# Patient Record
Sex: FEMALE | Race: WHITE | Employment: UNEMPLOYED
[De-identification: names, ages, dates, MRNs, and addresses within clinical notes are randomized per-mention and may not be internally consistent; named-entity substitution may affect disease eponyms.]

---

## 2017-01-26 ENCOUNTER — CARE COORDINATION (OUTPATIENT)
Dept: CARE COORDINATION | Facility: CLINIC | Age: 82
End: 2017-01-26

## 2017-02-21 ENCOUNTER — TELEPHONE (OUTPATIENT)
Dept: INTERNAL MEDICINE | Facility: CLINIC | Age: 82
End: 2017-02-21

## 2017-02-21 DIAGNOSIS — R53.1 WEAKNESS: Primary | ICD-10-CM

## 2017-02-21 DIAGNOSIS — R26.81 GAIT INSTABILITY: ICD-10-CM

## 2017-02-21 DIAGNOSIS — M19.91 PRIMARY OSTEOARTHRITIS, UNSPECIFIED SITE: ICD-10-CM

## 2017-02-21 DIAGNOSIS — G20 PARKINSON'S DISEASE (HCC): ICD-10-CM

## 2017-02-24 RX ORDER — LORAZEPAM 0.5 MG/1
TABLET ORAL
Qty: 90 TABLET | Refills: 0 | Status: SHIPPED | OUTPATIENT
Start: 2017-02-24 | End: 2017-03-29 | Stop reason: SDUPTHER

## 2017-03-16 ENCOUNTER — TELEPHONE (OUTPATIENT)
Dept: PRIMARY CARE CLINIC | Age: 82
End: 2017-03-16

## 2017-03-22 ENCOUNTER — TELEPHONE (OUTPATIENT)
Dept: PRIMARY CARE CLINIC | Age: 82
End: 2017-03-22

## 2017-03-26 DIAGNOSIS — B37.2 YEAST DERMATITIS: ICD-10-CM

## 2017-03-28 DIAGNOSIS — L30.9 DERMATITIS: Primary | ICD-10-CM

## 2017-03-28 RX ORDER — NYSTATIN 100000 U/G
CREAM TOPICAL
Qty: 30 G | Refills: 2 | Status: SHIPPED | OUTPATIENT
Start: 2017-03-28 | End: 2017-04-03 | Stop reason: SDUPTHER

## 2017-03-28 RX ORDER — TRIAMCINOLONE ACETONIDE 0.25 MG/G
CREAM TOPICAL
Qty: 30 G | Refills: 2 | Status: SHIPPED | OUTPATIENT
Start: 2017-03-28 | End: 2017-03-29 | Stop reason: ALTCHOICE

## 2017-03-29 ENCOUNTER — TELEPHONE (OUTPATIENT)
Dept: PRIMARY CARE CLINIC | Age: 82
End: 2017-03-29

## 2017-03-29 ENCOUNTER — OFFICE VISIT (OUTPATIENT)
Dept: PRIMARY CARE CLINIC | Age: 82
End: 2017-03-29
Payer: MEDICARE

## 2017-03-29 VITALS
DIASTOLIC BLOOD PRESSURE: 64 MMHG | SYSTOLIC BLOOD PRESSURE: 122 MMHG | BODY MASS INDEX: 37.17 KG/M2 | HEART RATE: 68 BPM | RESPIRATION RATE: 18 BRPM | HEIGHT: 62 IN | WEIGHT: 202 LBS

## 2017-03-29 DIAGNOSIS — E11.9 TYPE 2 DIABETES MELLITUS WITHOUT COMPLICATION, WITHOUT LONG-TERM CURRENT USE OF INSULIN (HCC): Primary | ICD-10-CM

## 2017-03-29 DIAGNOSIS — G20 PARKINSON'S DISEASE (HCC): ICD-10-CM

## 2017-03-29 DIAGNOSIS — R26.81 GAIT INSTABILITY: ICD-10-CM

## 2017-03-29 DIAGNOSIS — M15.9 OSTEOARTHRITIS OF MULTIPLE JOINTS, UNSPECIFIED OSTEOARTHRITIS TYPE: Chronic | ICD-10-CM

## 2017-03-29 DIAGNOSIS — B37.2 YEAST DERMATITIS: ICD-10-CM

## 2017-03-29 LAB — HBA1C MFR BLD: 7 %

## 2017-03-29 PROCEDURE — G8400 PT W/DXA NO RESULTS DOC: HCPCS | Performed by: NURSE PRACTITIONER

## 2017-03-29 PROCEDURE — G8598 ASA/ANTIPLAT THER USED: HCPCS | Performed by: NURSE PRACTITIONER

## 2017-03-29 PROCEDURE — 83036 HEMOGLOBIN GLYCOSYLATED A1C: CPT | Performed by: NURSE PRACTITIONER

## 2017-03-29 PROCEDURE — 1123F ACP DISCUSS/DSCN MKR DOCD: CPT | Performed by: NURSE PRACTITIONER

## 2017-03-29 PROCEDURE — G8484 FLU IMMUNIZE NO ADMIN: HCPCS | Performed by: NURSE PRACTITIONER

## 2017-03-29 PROCEDURE — G8427 DOCREV CUR MEDS BY ELIG CLIN: HCPCS | Performed by: NURSE PRACTITIONER

## 2017-03-29 PROCEDURE — 99214 OFFICE O/P EST MOD 30 MIN: CPT | Performed by: NURSE PRACTITIONER

## 2017-03-29 PROCEDURE — G8417 CALC BMI ABV UP PARAM F/U: HCPCS | Performed by: NURSE PRACTITIONER

## 2017-03-29 PROCEDURE — 4040F PNEUMOC VAC/ADMIN/RCVD: CPT | Performed by: NURSE PRACTITIONER

## 2017-03-29 PROCEDURE — 1090F PRES/ABSN URINE INCON ASSESS: CPT | Performed by: NURSE PRACTITIONER

## 2017-03-29 PROCEDURE — 1036F TOBACCO NON-USER: CPT | Performed by: NURSE PRACTITIONER

## 2017-03-29 RX ORDER — LORAZEPAM 0.5 MG/1
TABLET ORAL
Qty: 30 TABLET | Refills: 0 | Status: SHIPPED | OUTPATIENT
Start: 2017-03-29 | End: 2017-05-22 | Stop reason: SDUPTHER

## 2017-03-29 RX ORDER — TRAMADOL HYDROCHLORIDE 50 MG/1
TABLET ORAL
Qty: 120 TABLET | Refills: 0 | Status: SHIPPED | OUTPATIENT
Start: 2017-03-29 | End: 2017-12-01 | Stop reason: SDUPTHER

## 2017-03-29 RX ORDER — HYDROCODONE BITARTRATE AND ACETAMINOPHEN 5; 325 MG/1; MG/1
1 TABLET ORAL EVERY 8 HOURS PRN
Qty: 90 TABLET | Refills: 0 | Status: SHIPPED | OUTPATIENT
Start: 2017-03-29 | End: 2017-12-01 | Stop reason: SDUPTHER

## 2017-03-29 ASSESSMENT — ENCOUNTER SYMPTOMS
PHOTOPHOBIA: 0
RHINORRHEA: 0
SORE THROAT: 0
COUGH: 0
BACK PAIN: 1
SHORTNESS OF BREATH: 0
CONSTIPATION: 0
DIARRHEA: 0

## 2017-04-03 ENCOUNTER — TELEPHONE (OUTPATIENT)
Dept: PRIMARY CARE CLINIC | Age: 82
End: 2017-04-03

## 2017-04-03 RX ORDER — NYSTATIN 100000 U/G
CREAM TOPICAL
Qty: 30 G | Refills: 1 | Status: SHIPPED | OUTPATIENT
Start: 2017-04-03

## 2017-04-19 ENCOUNTER — TELEPHONE (OUTPATIENT)
Dept: PRIMARY CARE CLINIC | Age: 82
End: 2017-04-19

## 2017-05-22 RX ORDER — LORAZEPAM 0.5 MG/1
TABLET ORAL
Qty: 30 TABLET | Refills: 1 | Status: SHIPPED | OUTPATIENT
Start: 2017-05-22 | End: 2017-12-01 | Stop reason: ALTCHOICE

## 2017-06-16 ENCOUNTER — TELEPHONE (OUTPATIENT)
Dept: PRIMARY CARE CLINIC | Age: 82
End: 2017-06-16

## 2017-06-16 RX ORDER — SENNA PLUS 8.6 MG/1
TABLET ORAL
Qty: 60 TABLET | Refills: 11 | Status: SHIPPED | OUTPATIENT
Start: 2017-06-16 | End: 2017-12-01 | Stop reason: ALTCHOICE

## 2017-06-24 ENCOUNTER — APPOINTMENT (OUTPATIENT)
Dept: CT IMAGING | Age: 82
End: 2017-06-24
Payer: MEDICARE

## 2017-06-24 ENCOUNTER — HOSPITAL ENCOUNTER (EMERGENCY)
Age: 82
Discharge: HOME | End: 2017-06-24
Attending: EMERGENCY MEDICINE
Payer: MEDICARE

## 2017-06-24 VITALS
TEMPERATURE: 97.9 F | HEART RATE: 66 BPM | RESPIRATION RATE: 18 BRPM | BODY MASS INDEX: 36.62 KG/M2 | OXYGEN SATURATION: 96 % | SYSTOLIC BLOOD PRESSURE: 152 MMHG | HEIGHT: 62 IN | WEIGHT: 199 LBS | DIASTOLIC BLOOD PRESSURE: 61 MMHG

## 2017-06-24 DIAGNOSIS — K59.00 CONSTIPATION, UNSPECIFIED CONSTIPATION TYPE: Primary | ICD-10-CM

## 2017-06-24 LAB
ABSOLUTE EOS #: 0.1 K/UL (ref 0–0.4)
ABSOLUTE LYMPH #: 0.5 K/UL (ref 1–4.8)
ABSOLUTE MONO #: 0.5 K/UL (ref 0.1–1.2)
AMYLASE: 32 U/L (ref 28–100)
ANION GAP SERPL CALCULATED.3IONS-SCNC: 15 MMOL/L (ref 9–17)
BASOPHILS # BLD: 1 %
BASOPHILS ABSOLUTE: 0 K/UL (ref 0–0.2)
BILIRUBIN URINE: NEGATIVE
BUN BLDV-MCNC: 15 MG/DL (ref 8–23)
BUN/CREAT BLD: ABNORMAL (ref 9–20)
CALCIUM SERPL-MCNC: 9.6 MG/DL (ref 8.6–10.4)
CHLORIDE BLD-SCNC: 101 MMOL/L (ref 98–107)
CO2: 23 MMOL/L (ref 20–31)
COLOR: YELLOW
COMMENT UA: NORMAL
CREAT SERPL-MCNC: 0.77 MG/DL (ref 0.5–0.9)
DIFFERENTIAL TYPE: ABNORMAL
EOSINOPHILS RELATIVE PERCENT: 2 %
GFR AFRICAN AMERICAN: >60 ML/MIN
GFR NON-AFRICAN AMERICAN: >60 ML/MIN
GFR SERPL CREATININE-BSD FRML MDRD: ABNORMAL ML/MIN/{1.73_M2}
GFR SERPL CREATININE-BSD FRML MDRD: ABNORMAL ML/MIN/{1.73_M2}
GLUCOSE BLD-MCNC: 161 MG/DL (ref 70–99)
GLUCOSE URINE: NEGATIVE
HCT VFR BLD CALC: 36.6 % (ref 36–46)
HEMOGLOBIN: 11.9 G/DL (ref 12–16)
KETONES, URINE: NEGATIVE
LEUKOCYTE ESTERASE, URINE: NEGATIVE
LIPASE: 21 U/L (ref 13–60)
LYMPHOCYTES # BLD: 9 %
MCH RBC QN AUTO: 28.2 PG (ref 26–34)
MCHC RBC AUTO-ENTMCNC: 32.4 G/DL (ref 31–37)
MCV RBC AUTO: 86.8 FL (ref 80–100)
MONOCYTES # BLD: 9 %
NITRITE, URINE: NEGATIVE
PDW BLD-RTO: 14.3 % (ref 12.5–15.4)
PH UA: 7 (ref 5–8)
PLATELET # BLD: 261 K/UL (ref 140–450)
PLATELET ESTIMATE: ABNORMAL
PMV BLD AUTO: 7.7 FL (ref 6–12)
POTASSIUM SERPL-SCNC: 4.6 MMOL/L (ref 3.7–5.3)
PROTEIN UA: NEGATIVE
RBC # BLD: 4.21 M/UL (ref 4–5.2)
RBC # BLD: ABNORMAL 10*6/UL
SEG NEUTROPHILS: 79 %
SEGMENTED NEUTROPHILS ABSOLUTE COUNT: 4.4 K/UL (ref 1.8–7.7)
SODIUM BLD-SCNC: 139 MMOL/L (ref 135–144)
SPECIFIC GRAVITY UA: 1.01 (ref 1–1.03)
TURBIDITY: CLEAR
URINE HGB: NEGATIVE
UROBILINOGEN, URINE: NORMAL
WBC # BLD: 5.5 K/UL (ref 3.5–11)
WBC # BLD: ABNORMAL 10*3/UL

## 2017-06-24 PROCEDURE — 83690 ASSAY OF LIPASE: CPT

## 2017-06-24 PROCEDURE — 74177 CT ABD & PELVIS W/CONTRAST: CPT

## 2017-06-24 PROCEDURE — 2580000003 HC RX 258: Performed by: EMERGENCY MEDICINE

## 2017-06-24 PROCEDURE — 80048 BASIC METABOLIC PNL TOTAL CA: CPT

## 2017-06-24 PROCEDURE — 36415 COLL VENOUS BLD VENIPUNCTURE: CPT

## 2017-06-24 PROCEDURE — 85025 COMPLETE CBC W/AUTO DIFF WBC: CPT

## 2017-06-24 PROCEDURE — 82150 ASSAY OF AMYLASE: CPT

## 2017-06-24 PROCEDURE — 6360000004 HC RX CONTRAST MEDICATION: Performed by: EMERGENCY MEDICINE

## 2017-06-24 PROCEDURE — 99284 EMERGENCY DEPT VISIT MOD MDM: CPT

## 2017-06-24 RX ORDER — 0.9 % SODIUM CHLORIDE 0.9 %
1000 INTRAVENOUS SOLUTION INTRAVENOUS ONCE
Status: COMPLETED | OUTPATIENT
Start: 2017-06-24 | End: 2017-06-24

## 2017-06-24 RX ORDER — LACTULOSE 10 G/10G
10 SOLUTION ORAL 3 TIMES DAILY
Qty: 10 PACKET | Refills: 0 | Status: SHIPPED | OUTPATIENT
Start: 2017-06-24 | End: 2017-12-01 | Stop reason: ALTCHOICE

## 2017-06-24 RX ORDER — SODIUM CHLORIDE 0.9 % (FLUSH) 0.9 %
10 SYRINGE (ML) INJECTION PRN
Status: DISCONTINUED | OUTPATIENT
Start: 2017-06-24 | End: 2017-06-24 | Stop reason: HOSPADM

## 2017-06-24 RX ORDER — 0.9 % SODIUM CHLORIDE 0.9 %
100 INTRAVENOUS SOLUTION INTRAVENOUS ONCE
Status: COMPLETED | OUTPATIENT
Start: 2017-06-24 | End: 2017-06-24

## 2017-06-24 RX ADMIN — SODIUM CHLORIDE 100 ML: 9 INJECTION, SOLUTION INTRAVENOUS at 10:39

## 2017-06-24 RX ADMIN — IOHEXOL 50 ML: 240 INJECTION, SOLUTION INTRATHECAL; INTRAVASCULAR; INTRAVENOUS; ORAL at 10:39

## 2017-06-24 RX ADMIN — SODIUM CHLORIDE 1000 ML: 9 INJECTION, SOLUTION INTRAVENOUS at 09:57

## 2017-06-24 RX ADMIN — Medication 10 ML: at 10:40

## 2017-06-24 RX ADMIN — IOVERSOL 130 ML: 741 INJECTION INTRA-ARTERIAL; INTRAVENOUS at 10:39

## 2017-07-07 DIAGNOSIS — I50.32 CHRONIC DIASTOLIC HEART FAILURE (HCC): ICD-10-CM

## 2017-07-07 DIAGNOSIS — Z13.6 SCREENING FOR CARDIOVASCULAR CONDITION: ICD-10-CM

## 2017-07-07 DIAGNOSIS — E11.9 CONTROLLED TYPE 2 DIABETES MELLITUS WITHOUT COMPLICATION, WITHOUT LONG-TERM CURRENT USE OF INSULIN (HCC): ICD-10-CM

## 2017-07-07 LAB
CHOLESTEROL, TOTAL: 145 MG/DL
CHOLESTEROL/HDL RATIO: 3.2
CREATININE URINE: 125 MG/DL
HDLC SERPL-MCNC: 45 MG/DL (ref 35–70)
LDL CHOLESTEROL CALCULATED: 72 MG/DL (ref 0–160)
MICROALBUMIN/CREAT 24H UR: 16.9 MG/G{CREAT}
TRIGL SERPL-MCNC: 139 MG/DL
VLDLC SERPL CALC-MCNC: 28 MG/DL

## 2017-07-10 ENCOUNTER — TELEPHONE (OUTPATIENT)
Dept: PRIMARY CARE CLINIC | Age: 82
End: 2017-07-10

## 2017-09-28 ENCOUNTER — PATIENT MESSAGE (OUTPATIENT)
Dept: PRIMARY CARE CLINIC | Age: 82
End: 2017-09-28

## 2017-09-28 DIAGNOSIS — J44.1 COPD EXACERBATION (HCC): Chronic | ICD-10-CM

## 2017-09-28 DIAGNOSIS — G20 PARKINSON'S DISEASE (HCC): Chronic | ICD-10-CM

## 2017-09-28 DIAGNOSIS — M15.9 OSTEOARTHRITIS OF MULTIPLE JOINTS, UNSPECIFIED OSTEOARTHRITIS TYPE: Primary | Chronic | ICD-10-CM

## 2017-09-28 DIAGNOSIS — I50.9 CONGESTIVE HEART FAILURE, UNSPECIFIED CONGESTIVE HEART FAILURE CHRONICITY, UNSPECIFIED CONGESTIVE HEART FAILURE TYPE: ICD-10-CM

## 2017-09-29 ENCOUNTER — TELEPHONE (OUTPATIENT)
Dept: PRIMARY CARE CLINIC | Age: 82
End: 2017-09-29

## 2017-11-21 ENCOUNTER — TELEPHONE (OUTPATIENT)
Dept: PRIMARY CARE CLINIC | Age: 82
End: 2017-11-21

## 2017-11-22 NOTE — TELEPHONE ENCOUNTER
Spoke to [facility] and let her know pt needs an appointment as we did not prescribe the medication. She will let compliance know.

## 2017-11-28 ENCOUNTER — TELEPHONE (OUTPATIENT)
Dept: PRIMARY CARE CLINIC | Age: 82
End: 2017-11-28

## 2017-11-28 NOTE — TELEPHONE ENCOUNTER
Pt disch from [facility] on 11/28/17. FU appt with Veronique Guerin is sched 12/01/17. Please request medical records for the upcoming appt.

## 2017-11-28 NOTE — TELEPHONE ENCOUNTER
Writer contacted [facility] medical records department to obtain medical records. Writer was informed that patient is currently in house and has not been discharged.

## 2017-12-01 ENCOUNTER — HOSPITAL ENCOUNTER (OUTPATIENT)
Age: 82
Setting detail: SPECIMEN
Discharge: HOME | End: 2017-12-01
Payer: MEDICARE

## 2017-12-01 ENCOUNTER — OFFICE VISIT (OUTPATIENT)
Dept: PRIMARY CARE CLINIC | Age: 82
End: 2017-12-01
Payer: MEDICARE

## 2017-12-01 VITALS
SYSTOLIC BLOOD PRESSURE: 144 MMHG | RESPIRATION RATE: 18 BRPM | DIASTOLIC BLOOD PRESSURE: 80 MMHG | HEART RATE: 78 BPM | HEIGHT: 61 IN

## 2017-12-01 DIAGNOSIS — E04.1 THYROID NODULE: ICD-10-CM

## 2017-12-01 DIAGNOSIS — J44.1 COPD EXACERBATION (HCC): Chronic | ICD-10-CM

## 2017-12-01 DIAGNOSIS — E11.9 TYPE 2 DIABETES MELLITUS WITHOUT COMPLICATION, WITHOUT LONG-TERM CURRENT USE OF INSULIN (HCC): Primary | ICD-10-CM

## 2017-12-01 DIAGNOSIS — G20 PARKINSON'S DISEASE (HCC): Chronic | ICD-10-CM

## 2017-12-01 DIAGNOSIS — R93.5 ABNORMAL CT OF THE ABDOMEN: ICD-10-CM

## 2017-12-01 DIAGNOSIS — R35.0 URINARY FREQUENCY: ICD-10-CM

## 2017-12-01 DIAGNOSIS — M15.9 OSTEOARTHRITIS OF MULTIPLE JOINTS, UNSPECIFIED OSTEOARTHRITIS TYPE: Chronic | ICD-10-CM

## 2017-12-01 DIAGNOSIS — R35.0 FREQUENCY OF URINATION: ICD-10-CM

## 2017-12-01 LAB
APPEARANCE FLUID: NORMAL
BILIRUBIN, POC: NORMAL
BLOOD URINE, POC: NORMAL
CLARITY, POC: NORMAL
COLOR, POC: YELLOW
GLUCOSE URINE, POC: NORMAL
HBA1C MFR BLD: 7 %
KETONES, POC: NORMAL
LEUKOCYTE EST, POC: NORMAL
NITRITE, POC: NORMAL
PH, POC: 5
PROTEIN, POC: NORMAL
SPECIFIC GRAVITY, POC: 1.02
UROBILINOGEN, POC: 0.2

## 2017-12-01 PROCEDURE — G8417 CALC BMI ABV UP PARAM F/U: HCPCS | Performed by: NURSE PRACTITIONER

## 2017-12-01 PROCEDURE — G8484 FLU IMMUNIZE NO ADMIN: HCPCS | Performed by: NURSE PRACTITIONER

## 2017-12-01 PROCEDURE — G8427 DOCREV CUR MEDS BY ELIG CLIN: HCPCS | Performed by: NURSE PRACTITIONER

## 2017-12-01 PROCEDURE — 1036F TOBACCO NON-USER: CPT | Performed by: NURSE PRACTITIONER

## 2017-12-01 PROCEDURE — 83036 HEMOGLOBIN GLYCOSYLATED A1C: CPT | Performed by: NURSE PRACTITIONER

## 2017-12-01 PROCEDURE — 99214 OFFICE O/P EST MOD 30 MIN: CPT | Performed by: NURSE PRACTITIONER

## 2017-12-01 PROCEDURE — 3023F SPIROM DOC REV: CPT | Performed by: NURSE PRACTITIONER

## 2017-12-01 PROCEDURE — 4040F PNEUMOC VAC/ADMIN/RCVD: CPT | Performed by: NURSE PRACTITIONER

## 2017-12-01 PROCEDURE — 1123F ACP DISCUSS/DSCN MKR DOCD: CPT | Performed by: NURSE PRACTITIONER

## 2017-12-01 PROCEDURE — G8926 SPIRO NO PERF OR DOC: HCPCS | Performed by: NURSE PRACTITIONER

## 2017-12-01 PROCEDURE — G8400 PT W/DXA NO RESULTS DOC: HCPCS | Performed by: NURSE PRACTITIONER

## 2017-12-01 PROCEDURE — 1090F PRES/ABSN URINE INCON ASSESS: CPT | Performed by: NURSE PRACTITIONER

## 2017-12-01 PROCEDURE — G8598 ASA/ANTIPLAT THER USED: HCPCS | Performed by: NURSE PRACTITIONER

## 2017-12-01 RX ORDER — TRAMADOL HYDROCHLORIDE 50 MG/1
TABLET ORAL
Qty: 120 TABLET | Refills: 0 | Status: SHIPPED | OUTPATIENT
Start: 2017-12-01 | End: 2017-12-15 | Stop reason: SDUPTHER

## 2017-12-01 RX ORDER — HYDROCODONE BITARTRATE AND ACETAMINOPHEN 5; 325 MG/1; MG/1
TABLET ORAL
Qty: 90 TABLET | Refills: 0 | Status: SHIPPED | OUTPATIENT
Start: 2017-12-01

## 2017-12-01 RX ORDER — GUAIFENESIN AND CODEINE PHOSPHATE 100; 10 MG/5ML; MG/5ML
5 SOLUTION ORAL 2 TIMES DAILY PRN
Qty: 100 ML | Refills: 0 | OUTPATIENT
Start: 2017-12-01 | End: 2017-12-08

## 2017-12-01 RX ORDER — CLONAZEPAM 1 MG/1
1 TABLET ORAL NIGHTLY PRN
COMMUNITY

## 2017-12-01 RX ORDER — LANCETS 30 GAUGE
1 EACH MISCELLANEOUS 2 TIMES DAILY
Qty: 200 EACH | Refills: 3 | Status: SHIPPED | OUTPATIENT
Start: 2017-12-01

## 2017-12-01 ASSESSMENT — PATIENT HEALTH QUESTIONNAIRE - PHQ9
SUM OF ALL RESPONSES TO PHQ9 QUESTIONS 1 & 2: 0
2. FEELING DOWN, DEPRESSED OR HOPELESS: 0
SUM OF ALL RESPONSES TO PHQ QUESTIONS 1-9: 0
1. LITTLE INTEREST OR PLEASURE IN DOING THINGS: 0

## 2017-12-01 ASSESSMENT — ENCOUNTER SYMPTOMS
COUGH: 1
VOMITING: 0
TROUBLE SWALLOWING: 0
SORE THROAT: 0
CONSTIPATION: 0
BLOOD IN STOOL: 0
BACK PAIN: 1
SINUS PRESSURE: 0
SHORTNESS OF BREATH: 1
FREQUENT THROAT CLEARING: 1
WHEEZING: 1
DIARRHEA: 0
ABDOMINAL PAIN: 0
NAUSEA: 0

## 2017-12-01 ASSESSMENT — COPD QUESTIONNAIRES: COPD: 1

## 2017-12-01 NOTE — PROGRESS NOTES
[facility] PHYSICIANS  [facility] INTERNAL MEDICINE  [address]  [address]  [place] [address]  Dept: [phone]  Dept Fax: [phone]    Mely Hanley is a 80 y.o. female who presents today for her medical conditions/complaints as noted below. Mely Hanley is c/o of   Chief Complaint   Patient presents with    Follow-Up from Hospital     follow up UTI and bronchitis     Diabetes     follow up    Nodule     found nodule on thyroid, liver, lungs per CT from hospital           HPI:     Here today for follow up after short hospital stay for COPD exac  Expressing concern over several abnormalities found on [address] did not feel need to address while she there, advised she follow up here    She reports her cough has remained persistent though she is feeling better in general, has been back in her apt since Tuesday  Lives in assisted living, her meds are administered to her  Discussed poor pain control, daughter feels they are not giving her tramadol or norco   Patient has long history of severe back pain, recent CT in hospital shows further decline      COPD   She complains of cough, frequent throat clearing, shortness of breath and wheezing. This is a chronic problem. The problem occurs constantly. The problem has been waxing and waning. The cough is productive of sputum. Associated symptoms include dyspnea on exertion and myalgias. Pertinent negatives include no appetite change, chest pain, ear pain, fever, headaches, sore throat or trouble swallowing. Her past medical history is significant for COPD. Back Pain   This is a chronic problem. The current episode started more than 1 year ago. The problem occurs constantly. The problem has been gradually worsening since onset. The pain is present in the lumbar spine. The quality of the pain is described as aching. The pain is at a severity of 7/10. The pain is moderate. The pain is worse during the night. The symptoms are aggravated by position. Smokeless tobacco: Never Used    Alcohol use No      Comment: rare      Current Outpatient Prescriptions   Medication Sig Dispense Refill    clonazePAM (KLONOPIN) 1 MG tablet Take 1 mg by mouth nightly as needed .  HYDROcodone-acetaminophen (NORCO) 5-325 MG per tablet Take 1 tablet every 8 hours as needed for severe pain and at bedtime as needed. 90 tablet 0    traMADol (ULTRAM) 50 MG tablet TAKE ONE TABLET BY MOUTH EVERY 6 HOURS AS NEEDED FOR mild to moderate PAIN. 120 tablet 0    glucose blood VI test strips (ONE TOUCH ULTRA TEST) strip Use to test blood sugar twice daily 200 strip 3    Lancets MISC 1 each by Does not apply route 2 times daily OneTouch Delica lancets. Dx: E11.9 200 each 3    guaiFENesin-codeine (TUSSI-ORGANIDIN NR) 100-10 MG/5ML syrup Take 5 mLs by mouth 2 times daily as needed for Cough or Congestion . 100 mL 0    Misc. Devices MISC Provide 3 handicap placards, expires 5 years from this date 9/28/2022  Medical conditions prevent the ability to walk long distances  Requires multiple placards due to transportation needs from multiple people 3 Device 0    nystatin (MYCOSTATIN) 632258 UNIT/GM cream Apply topically 2 times daily.  30 g 1    nystatin-triamcinolone (MYCOLOG II) 671136-9.1 UNIT/GM-% cream APPLY TOPICALLY TWO TIMES DAILY 60 g 1    metFORMIN (GLUCOPHAGE) 500 MG tablet Take 1 tablet by mouth daily (with breakfast) 30 tablet 3    busPIRone (BUSPAR) 5 MG tablet Take 1 tablet by mouth 3 times daily 90 tablet 1    linaclotide (LINZESS) 290 MCG CAPS capsule Take 1 capsule by mouth every morning (before breakfast) 90 capsule 3    nitroGLYCERIN (NITROSTAT) 0.4 MG SL tablet Place 0.4 mg under the tongue every 5 minutes as needed for Chest pain      carvedilol (COREG) 6.25 MG tablet Take 1 tablet by mouth 2 times daily (with meals) 180 tablet 3    amLODIPine (NORVASC) 2.5 MG tablet Take 2 tablets by mouth daily Takes 1 1/2  Po daily- to equal 7.5mg 90 tablet 3    carbidopa-levodopa (SINEMET)  MG per tablet Take 1 tablet by mouth 3 times daily (Patient taking differently: Take 1 tablet by mouth 3 times daily Tid extra 1/2 tab at 7 am) 270 tablet 3    rivastigmine (EXELON) 13.3 MG/24HR Place 1 patch onto the skin daily      escitalopram (LEXAPRO) 20 MG tablet Take 1 tablet by mouth daily 90 tablet 3    pantoprazole (PROTONIX) 40 MG tablet Take 1 tablet by mouth 2 times daily 180 tablet 3    Carbidopa-Levodopa ER (SINEMET CR)  MG per tablet Take 1 tablet by mouth 3 times daily Take at 7am, 11am, and 3pm along with the Carbidopa-levodopa  (Patient taking differently: Take 1 tablet by mouth Takes whole tab tid, and 1/2 tab) 270 tablet 1    meloxicam (MOBIC) 15 MG tablet Take 1 tablet by mouth daily 90 tablet 3    simvastatin (ZOCOR) 20 MG tablet Take 1 tablet by mouth nightly 90 tablet 3    lisinopril (PRINIVIL;ZESTRIL) 5 MG tablet Take 5 mg by mouth daily       ondansetron (ZOFRAN) 4 MG tablet Take 1 tablet by mouth every 8 hours as needed for Nausea or Vomiting 60 tablet 1    isosorbide mononitrate (IMDUR) 30 MG CR tablet Take 1 tablet by mouth daily. Take 2 tabs QD (Patient taking differently: Take 30 mg by mouth daily Takes 3 tablets daily) 180 tablet 3    Blood Glucose Monitoring Suppl (EASY STEP GLUCOSE MONITOR) W/DEVICE KIT 1 Device by Does not apply route 2 times daily. 1 kit 0    Polyethylene Glycol 3350 (MIRALAX PO) Take  by mouth as needed.  aspirin 81 MG tablet Take 81 mg by mouth daily. Patient takes 2 daily       No current facility-administered medications for this visit.       Allergies   Allergen Reactions    Haldol [Haloperidol Lactate] Other (See Comments)     Neurologist stated to never take it    Lipitor [Atorvastatin] Other (See Comments)     Makes her violent       Health Maintenance   Topic Date Due    Diabetic retinal exam  08/31/2016    DTaP/Tdap/Td vaccine (2 - Td) 01/01/2017    Flu vaccine (1) 09/01/2017    Diabetic hemoglobin A1C test  09/29/2017    Diabetic foot exam  12/28/2017    Lipid screen  07/07/2018    Zostavax vaccine  Completed    DEXA (modify frequency per FRAX score)  Addressed    Pneumococcal low/med risk  Completed       Subjective:      Review of Systems   Constitutional: Positive for fatigue. Negative for activity change, appetite change, chills, fever and unexpected weight change. HENT: Negative for congestion, ear pain, hearing loss, sinus pressure, sore throat and trouble swallowing. Eyes: Negative for visual disturbance. Respiratory: Positive for cough, shortness of breath and wheezing. Cardiovascular: Positive for dyspnea on exertion. Negative for chest pain, palpitations and leg swelling. Gastrointestinal: Negative for abdominal pain, blood in stool, constipation, diarrhea, nausea and vomiting. Endocrine: Negative for cold intolerance, heat intolerance, polydipsia, polyphagia and polyuria. Genitourinary: Negative for difficulty urinating, frequency, hematuria and urgency. Musculoskeletal: Positive for arthralgias, back pain, gait problem and myalgias. Rolling walker   Skin: Negative for rash. Allergic/Immunologic: Negative for environmental allergies. Neurological: Negative for dizziness, tingling, weakness, light-headedness, headaches and paresthesias. Psychiatric/Behavioral: Negative for confusion. The patient is not nervous/anxious. Objective:     Physical Exam   Constitutional: She is oriented to person, place, and time. She appears well-developed and well-nourished. HENT:   Head: Normocephalic. Eyes: Conjunctivae and EOM are normal. Pupils are equal, round, and reactive to light. Neck: Normal range of motion. Cardiovascular: Normal rate, regular rhythm, normal heart sounds and intact distal pulses. No murmur heard. Pulmonary/Chest: Effort normal and breath sounds normal. She has no wheezes. Abdominal: Soft.  Bowel sounds are normal. She Medications    HYDROcodone-acetaminophen (NORCO) 5-325 MG per tablet     Sig: Take 1 tablet every 8 hours as needed for severe pain and at bedtime as needed. Dispense:  90 tablet     Refill:  0     #90 for a 30 day supply. (30 day supply medication fill)    traMADol (ULTRAM) 50 MG tablet     Sig: TAKE ONE TABLET BY MOUTH EVERY 6 HOURS AS NEEDED FOR mild to moderate PAIN. Dispense:  120 tablet     Refill:  0    glucose blood VI test strips (ONE TOUCH ULTRA TEST) strip     Sig: Use to test blood sugar twice daily     Dispense:  200 strip     Refill:  3     DX: DM 2, E11.9    Lancets MISC     Si each by Does not apply route 2 times daily OneTouch Delica lancets. Dx: E11.9     Dispense:  200 each     Refill:  3    guaiFENesin-codeine (TUSSI-ORGANIDIN NR) 100-10 MG/5ML syrup     Sig: Take 5 mLs by mouth 2 times daily as needed for Cough or Congestion . Dispense:  100 mL     Refill:  0      DiabetesA1c stable, and continue current meds  COPD exacerbation, lung nodule, abnormal CTreviewed and discussed testing and treatments that were done while in hospital, currently using aerosol treatments, slowly improving. We will check nodules/cysts found on liver and thyroid with ultrasound, follow-up chest CT in 3 months. Cough syrup for use at at bedtime when necessary  Osteoarthritisworsening, refills on tramadol and Norco with specific instruction provided for assisted-living as they are administering her medications  Parkinson'stremors worsening, otherwise stable, continue regular follow-up with neurology  Urinary frequencyUA checked due to persistent complaints, had UTI in hospital, UA appears negative at this time but will send for culture   Patient given educational materials - see patient instructions. Discussed use, benefit, and side effects of prescribed medications. All patient questions answered. Pt voiced understanding. Reviewed health maintenance.   Instructed to continue current

## 2017-12-01 NOTE — PROGRESS NOTES
Chronic Disease Visit Information    BP Readings from Last 3 Encounters:   06/24/17 (!) 152/61   03/29/17 122/64   01/25/17 (!) 170/65          Hemoglobin A1C (%)   Date Value   03/29/2017 7.0   10/24/2016 6.9   07/26/2016 6.0     LDL Calculated (mg/dL)   Date Value   07/07/2017 72     HDL (mg/dL)   Date Value   07/07/2017 45     BUN (mg/dL)   Date Value   06/24/2017 15     CREATININE (mg/dL)   Date Value   06/24/2017 0.77     Glucose (mg/dL)   Date Value   06/24/2017 161 (H)            Have you changed or started any medications since your last visit including any over-the-counter medicines, vitamins, or herbal medicines? Yes, see med list  Are you having any side effects from any of your medications? -  no  Have you stopped taking any of your medications? Is so, why? -  no    Have you seen any other physician or provider since your last visit? Yes - Records Obtained  Have you had any other diagnostic tests since your last visit? Yes - Records Obtained  Have you been seen in the emergency room and/or had an admission to a hospital since we last saw you? Yes - Records Obtained  Have you had your annual diabetic retinal (eye) exam? Yes - Records Requested  Have you had your routine dental cleaning in the past 6 months? yes - November    Have you activated your [employer] account? If not, what are your barriers?  Yes     Patient Care Team:  Perla Gonzáles DO as PCP - [address] [place], [place] [address]DO riley as Consulting Physician  Giulia Shultz MD as Surgeon (Vascular Surgery)  Isabel Iqbal MD (General Surgery)  Rock Ariella MD as Consulting Physician  Lulla Gilford as Consulting Physician (Pain Management)  Hue Asif as Consulting Physician (Vascular Surgery)         Medical History Review  Past Medical, Family, and Social History reviewed and does contribute to the patient presenting condition    Health Maintenance   Topic Date Due    Diabetic retinal exam  08/31/2016    DTaP/Tdap/Td vaccine (2 - Td) 01/01/2017    Flu vaccine (1) 09/01/2017    Diabetic hemoglobin A1C test  09/29/2017    Diabetic foot exam  12/28/2017    Lipid screen  07/07/2018    Zostavax vaccine  Completed    DEXA (modify frequency per FRAX score)  Addressed    Pneumococcal low/med risk  Completed

## 2017-12-02 LAB
CULTURE: NORMAL
CULTURE: NORMAL
Lab: NORMAL
SPECIMEN DESCRIPTION: NORMAL
STATUS: NORMAL

## 2017-12-05 ENCOUNTER — TELEPHONE (OUTPATIENT)
Dept: PRIMARY CARE CLINIC | Age: 82
End: 2017-12-05

## 2017-12-05 NOTE — TELEPHONE ENCOUNTER
----- Message from Arias Kapadia CNP sent at 12/4/2017  9:59 AM EST -----  Please let her daughter know that patient's urine culture is negative, she does not have any current urinary tract infection

## 2017-12-12 ENCOUNTER — TELEPHONE (OUTPATIENT)
Dept: PRIMARY CARE CLINIC | Age: 82
End: 2017-12-12

## 2017-12-12 NOTE — TELEPHONE ENCOUNTER
Christophe Bowling from [place] called for home care orders, patient has been in [facility] twice once for TIA and for bronchitis. Gave verbal order for nursing and PT.

## 2017-12-13 ENCOUNTER — TELEPHONE (OUTPATIENT)
Dept: PRIMARY CARE CLINIC | Age: 82
End: 2017-12-13

## 2017-12-13 DIAGNOSIS — K44.9 HH (HIATUS HERNIA): Primary | Chronic | ICD-10-CM

## 2017-12-13 NOTE — TELEPHONE ENCOUNTER
Daughter Fletcher Camilo called mom needs referral to see Cinthia Barbour MD Gen Surgeon phone [phone] fax [phone] Dx: hiatal hernia. Needs it asap  Pt has SOB.  Please adress

## 2017-12-13 NOTE — TELEPHONE ENCOUNTER
CARLOS obtain medical records from [address] [place] [address] dates 11/30/2017 to 12/09/2017

## 2017-12-13 NOTE — TELEPHONE ENCOUNTER
Received a call from Barbi Davidson with [facility]. He states patient is currently a resident at [address] and advised that since patient is a resident at [facility] that the Home health will only provide PT services.

## 2017-12-14 NOTE — TELEPHONE ENCOUNTER
Notified daughter Shan Henley [place] [address] that writer faxed referral to Dr Harrison Geller office.

## 2017-12-15 ENCOUNTER — OFFICE VISIT (OUTPATIENT)
Dept: PRIMARY CARE CLINIC | Age: 82
End: 2017-12-15
Payer: MEDICARE

## 2017-12-15 ENCOUNTER — TELEPHONE (OUTPATIENT)
Dept: PRIMARY CARE CLINIC | Age: 82
End: 2017-12-15

## 2017-12-15 VITALS
DIASTOLIC BLOOD PRESSURE: 70 MMHG | HEIGHT: 61 IN | RESPIRATION RATE: 18 BRPM | WEIGHT: 194.8 LBS | SYSTOLIC BLOOD PRESSURE: 112 MMHG | HEART RATE: 68 BPM | BODY MASS INDEX: 36.78 KG/M2

## 2017-12-15 DIAGNOSIS — M54.50 CHRONIC BILATERAL LOW BACK PAIN WITHOUT SCIATICA: ICD-10-CM

## 2017-12-15 DIAGNOSIS — G45.9 TRANSIENT CEREBRAL ISCHEMIA, UNSPECIFIED TYPE: ICD-10-CM

## 2017-12-15 DIAGNOSIS — K44.9 HH (HIATUS HERNIA): Primary | Chronic | ICD-10-CM

## 2017-12-15 DIAGNOSIS — M15.9 OSTEOARTHRITIS OF MULTIPLE JOINTS, UNSPECIFIED OSTEOARTHRITIS TYPE: Chronic | ICD-10-CM

## 2017-12-15 DIAGNOSIS — G20 PARKINSON'S DISEASE (HCC): Chronic | ICD-10-CM

## 2017-12-15 DIAGNOSIS — M54.50 CHRONIC BILATERAL LOW BACK PAIN WITHOUT SCIATICA: Primary | ICD-10-CM

## 2017-12-15 DIAGNOSIS — I67.9 CEREBROVASCULAR DISEASE: ICD-10-CM

## 2017-12-15 DIAGNOSIS — G89.29 CHRONIC BILATERAL LOW BACK PAIN WITHOUT SCIATICA: ICD-10-CM

## 2017-12-15 DIAGNOSIS — G89.29 CHRONIC BILATERAL LOW BACK PAIN WITHOUT SCIATICA: Primary | ICD-10-CM

## 2017-12-15 PROCEDURE — 1123F ACP DISCUSS/DSCN MKR DOCD: CPT | Performed by: NURSE PRACTITIONER

## 2017-12-15 PROCEDURE — 1090F PRES/ABSN URINE INCON ASSESS: CPT | Performed by: NURSE PRACTITIONER

## 2017-12-15 PROCEDURE — G8427 DOCREV CUR MEDS BY ELIG CLIN: HCPCS | Performed by: NURSE PRACTITIONER

## 2017-12-15 PROCEDURE — G8482 FLU IMMUNIZE ORDER/ADMIN: HCPCS | Performed by: NURSE PRACTITIONER

## 2017-12-15 PROCEDURE — G8598 ASA/ANTIPLAT THER USED: HCPCS | Performed by: NURSE PRACTITIONER

## 2017-12-15 PROCEDURE — 4040F PNEUMOC VAC/ADMIN/RCVD: CPT | Performed by: NURSE PRACTITIONER

## 2017-12-15 PROCEDURE — G8400 PT W/DXA NO RESULTS DOC: HCPCS | Performed by: NURSE PRACTITIONER

## 2017-12-15 PROCEDURE — 1036F TOBACCO NON-USER: CPT | Performed by: NURSE PRACTITIONER

## 2017-12-15 PROCEDURE — 99214 OFFICE O/P EST MOD 30 MIN: CPT | Performed by: NURSE PRACTITIONER

## 2017-12-15 PROCEDURE — G8417 CALC BMI ABV UP PARAM F/U: HCPCS | Performed by: NURSE PRACTITIONER

## 2017-12-15 RX ORDER — TRAMADOL HYDROCHLORIDE 50 MG/1
50 TABLET ORAL EVERY 8 HOURS
Qty: 120 TABLET | Refills: 0 | Status: SHIPPED | OUTPATIENT
Start: 2017-12-15

## 2017-12-15 RX ORDER — ASPIRIN 325 MG
325 TABLET ORAL DAILY
COMMUNITY

## 2017-12-15 ASSESSMENT — ENCOUNTER SYMPTOMS
SHORTNESS OF BREATH: 1
TROUBLE SWALLOWING: 0
SINUS PRESSURE: 0
SORE THROAT: 0
VOMITING: 0
WHEEZING: 0
COUGH: 0
NAUSEA: 0
CONSTIPATION: 0
DIARRHEA: 0
ABDOMINAL PAIN: 0
ABDOMINAL DISTENTION: 1
BLOOD IN STOOL: 0

## 2017-12-15 NOTE — TELEPHONE ENCOUNTER
Referral placed to [facility] [place]

## 2017-12-15 NOTE — PROGRESS NOTES
[facility] PHYSICIANS  [facility] INTERNAL MEDICINE  [address]  [address]  [place] [address]  Dept: [phone]  Dept Fax: [phone]    Yessica Suresh is a 80 y.o. female who presents today for her medical conditions/complaints as noted below.   Yessica Suresh is c/o of   Chief Complaint   Patient presents with    Follow-Up from [facility] follow up-TIA           HPI:     Present with son-in-law for hospital follow up  Was admitted for SOB  Has significant hiatal hernia which causes her to feel SOB often especially when she bends  Also found to be having TIAs    Her chronic low back pain remains poorly controlled  In assisted living environment where she has to ask for her tramadol or norco so has not been receiving  Discussed options to improve pain control        Hemoglobin A1C (%)   Date Value   12/01/2017 7.0   03/29/2017 7.0   10/24/2016 6.9             ( goal A1C is < 7)   No results found for: LABMICR  LDL Calculated (mg/dL)   Date Value   07/07/2017 72   10/12/2016 76   10/21/2015 69       (goal LDL is <100)   AST (U/L)   Date Value   01/24/2017 14     ALT (U/L)   Date Value   01/24/2017 15     BUN (mg/dL)   Date Value   06/24/2017 15     BP Readings from Last 3 Encounters:   12/15/17 112/70   12/01/17 (!) 144/80   06/24/17 (!) 152/61          (goal 120/80)    Past Medical History:   Diagnosis Date    B12 deficiency anemia 8/20/2015    CAD (coronary artery disease) 2/25/2015    Carotid artery stenosis     Cerebrovascular disease     CHF (congestive heart failure) ([address]     Congestive heart disease ([address]     COPD (chronic obstructive pulmonary disease) ([address]     Depression     Fe deficiency anemia 8/20/2015    Gum disease     HH (hiatus hernia)     Hypertension     Obesity     Osteoarthritis     Parkinson's disease ([address]     Restless legs syndrome     TIA (transient ischemic attack)     Unspecified sleep apnea       Past Surgical History:   Procedure Laterality Date    BREAST SURGERY Bilateral 1977 and 1989    biopsy per dr Jian Trevizo Left [id number]    CHOLECYSTECTOMY      CORONARY ARTERY BYPASS GRAFT      EYE SURGERY Bilateral [id number]    dr Candice Peter N/A [id number]    ventral repair with mesh/umbilical per dr erwin Felton Right [id number]    dr Su Hernandez [id number]    sq cell lesion per dr erwin jules   [address]   [id number]    part. colectomy per dr Bethany Harrison       History reviewed. No pertinent family history. Social History   Substance Use Topics    Smoking status: Never Smoker    Smokeless tobacco: Never Used    Alcohol use No      Comment: rare      Current Outpatient Prescriptions   Medication Sig Dispense Refill    aspirin 325 MG tablet Take 325 mg by mouth daily      ticagrelor (BRILINTA) 60 MG TABS tablet Take 90 mg by mouth       MISC NATURAL PRODUCTS PO Take by mouth Takes domperidone from [place] ([facility])      traMADol (ULTRAM) 50 MG tablet Take 1 tablet by mouth every 8 hours  TAKE ONE TABLET BY MOUTH EVERY 6 HOURS AS NEEDED FOR mild to moderate PAIN. 120 tablet 0    clonazePAM (KLONOPIN) 1 MG tablet Take 1 mg by mouth nightly as needed .  HYDROcodone-acetaminophen (NORCO) 5-325 MG per tablet Take 1 tablet every 8 hours as needed for severe pain and at bedtime as needed. 90 tablet 0    glucose blood VI test strips (ONE TOUCH ULTRA TEST) strip Use to test blood sugar twice daily 200 strip 3    Lancets MISC 1 each by Does not apply route 2 times daily OneTouch Delica lancets. Dx: E11.9 200 each 3    Misc. Devices MISC Provide 3 handicap placards, expires 5 years from this date 9/28/2022  Medical conditions prevent the ability to walk long distances  Requires multiple placards due to transportation needs from multiple people 3 Device 0    nystatin (MYCOSTATIN) 698483 UNIT/GM cream Apply topically 2 times daily.  30 g 1    nystatin-triamcinolone (Favian Munson II) 587706-7.6 Parkinson's-cont current meds and follow up with neuro as directed  Osteoarthritis, back pain-rx printed with instructions to administer tramadol TID. If her pain does not improve with try the norco. Also discussed palliative care referral, son-in-law will discuss with wife and they will call if would like referral placed. Patient given educational materials - see patient instructions. Discussed use, benefit, and side effects of prescribed medications. All patient questions answered. Pt voiced understanding. Reviewed health maintenance. Instructed to continue current medications, diet and exercise. Patient agreed with treatment plan. Follow up as directed.      Electronically signed by Tom Lake CNP on 12/15/2017 at 3:33 PM

## 2017-12-15 NOTE — PROGRESS NOTES
Chronic Disease Visit Information    BP Readings from Last 3 Encounters:   12/01/17 (!) 144/80   06/24/17 (!) 152/61   03/29/17 122/64          Hemoglobin A1C (%)   Date Value   12/01/2017 7.0   03/29/2017 7.0   10/24/2016 6.9     LDL Calculated (mg/dL)   Date Value   07/07/2017 72     HDL (mg/dL)   Date Value   07/07/2017 45     BUN (mg/dL)   Date Value   06/24/2017 15     CREATININE (mg/dL)   Date Value   06/24/2017 0.77     Glucose (mg/dL)   Date Value   06/24/2017 161 (H)            Have you changed or started any medications since your last visit including any over-the-counter medicines, vitamins, or herbal medicines? yes - see med list   Are you having any side effects from any of your medications? -  no  Have you stopped taking any of your medications? Is so, why? -  yes - see med list    Have you seen any other physician or provider since your last visit? Yes - Records Requested  Have you had any other diagnostic tests since your last visit? Yes - Records Requested  Have you been seen in the emergency room and/or had an admission to a hospital since we last saw you? Yes - Records Requested  Have you had your annual diabetic retinal (eye) exam? Yes - Records Requested  Have you had your routine dental cleaning in the past 6 months? yes - August    Have you activated your [employer] account? If not, what are your barriers?  Yes     Patient Care Team:  Weston Wilson DO as PCP - [address] [place] [address] DO Nyla as Consulting Physician  Zettie Holter, MD as Surgeon (Vascular Surgery)  Erik Holm MD (General Surgery)  Ronda Cranker, MD as Consulting Physician  Carli Mobley as Consulting Physician (Pain Management)  Roberto Regalado as Consulting Physician (Vascular Surgery)         Medical History Review  Past Medical, Family, and Social History reviewed and does contribute to the patient presenting condition    Health Maintenance   Topic Date Due    Diabetic retinal exam  08/31/2016    DTaP/Tdap/Td vaccine (2 - Td) 01/01/2017    Flu vaccine (1) 09/01/2017    Diabetic foot exam  12/28/2017    Diabetic hemoglobin A1C test  06/01/2018    Lipid screen  07/07/2018    Zostavax vaccine  Completed    DEXA (modify frequency per FRAX score)  Addressed    Pneumococcal low/med risk  Completed

## 2017-12-19 ENCOUNTER — TELEPHONE (OUTPATIENT)
Dept: PRIMARY CARE CLINIC | Age: 82
End: 2017-12-19

## 2017-12-19 NOTE — TELEPHONE ENCOUNTER
Pt's daughter was told referral to Dr Houston Dose was sent, they did not receive it. I will refax.     Health Maintenance   Topic Date Due    Diabetic retinal exam  08/31/2016    DTaP/Tdap/Td vaccine (2 - Td) 01/01/2017    Diabetic foot exam  12/28/2017    Diabetic hemoglobin A1C test  06/01/2018    Lipid screen  07/07/2018    Zostavax vaccine  Completed    DEXA (modify frequency per FRAX score)  Addressed    Flu vaccine  Completed    Pneumococcal low/med risk  Completed             (applicable per patient's age: Cancer Screenings, Depression Screening, Fall Risk Screening, Immunizations)    Hemoglobin A1C (%)   Date Value   12/01/2017 7.0   03/29/2017 7.0   10/24/2016 6.9     LDL Calculated (mg/dL)   Date Value   07/07/2017 72     AST (U/L)   Date Value   01/24/2017 14     ALT (U/L)   Date Value   01/24/2017 15     BUN (mg/dL)   Date Value   06/24/2017 15      (goal A1C is < 7)   (goal LDL is <100) need 30-50% reduction from baseline     BP Readings from Last 3 Encounters:   12/15/17 112/70   12/01/17 (!) 144/80   06/24/17 (!) 152/61    (goal /80)      All Future Testing planned in [facility]:  Lab Frequency Next Occurrence   US Liver Once 01/01/2018   US HEAD NECK SOFT TISSUE THYROID Once 01/01/2018   CT CHEST DIAGNOSTIC LOW DOSE Once 03/01/2018       Next Visit Date:  Future Appointments  Date Time Provider Jahaira Lubin   3/15/2018 10:00 AM [facility]            Patient Active Problem List:     HH (hiatus hernia)     Gum disease     TIA (transient ischemic attack)     Sleep apnea     Osteoarthritis     COPD exacerbation ([address]     Depression     Essential hypertension     Cerebrovascular disease     CHF (congestive heart failure) ([address]     Carotid artery stenosis     Parkinson's disease ([address]     Non morbid obesity due to excess calories     Restless legs syndrome     CAD (coronary artery disease)     GERD (gastroesophageal reflux disease)     Fe deficiency anemia     B12

## 2017-12-26 DIAGNOSIS — E04.1 THYROID NODULE: ICD-10-CM

## 2017-12-27 ENCOUNTER — TELEPHONE (OUTPATIENT)
Dept: PRIMARY CARE CLINIC | Age: 82
End: 2017-12-27

## 2017-12-27 DIAGNOSIS — R93.5 ABNORMAL CT OF THE ABDOMEN: ICD-10-CM

## 2017-12-27 NOTE — TELEPHONE ENCOUNTER
----- Message from Keira Turner CNP sent at 12/26/2017  5:10 PM EST -----  Please let her know ultrasound of thyroid is normal, no nodules were seen

## 2017-12-27 NOTE — TELEPHONE ENCOUNTER
Agnieszka from [facility] called called to inform office that they are discharging patient from their care due to patient now with palliative care.

## 2017-12-29 ENCOUNTER — TELEPHONE (OUTPATIENT)
Dept: PRIMARY CARE CLINIC | Age: 82
End: 2017-12-29

## 2017-12-29 NOTE — TELEPHONE ENCOUNTER
----- Message from [address], CNP sent at 12/29/2017  9:39 AM EST -----  Please let her know that her liver US does not show any cancer, only benign cysts  No further action is needed

## 2018-01-11 ENCOUNTER — TELEPHONE (OUTPATIENT)
Dept: PRIMARY CARE CLINIC | Age: 83
End: 2018-01-11

## 2018-01-11 NOTE — TELEPHONE ENCOUNTER
Asher Mendoza with [address] [phone]. Requesting orders for a Hospice consult. Patient's daughter Yonis Blank and NP Kaleigh Quinn who is currently seeing patient at [facility]) agrees with the Hospice consult per Asher Mendoza. Orders require a Physicians signature. Patient was last seen on 12/15/17-[place].     Health Maintenance   Topic Date Due    Diabetic retinal exam  08/31/2016    DTaP/Tdap/Td vaccine (2 - Td) 01/01/2017    Diabetic foot exam  12/28/2017    A1C test (Diabetic or Prediabetic)  06/01/2018    Lipid screen  07/07/2018    Potassium monitoring  07/07/2018    Creatinine monitoring  07/07/2018    Zostavax vaccine  Completed    DEXA (modify frequency per FRAX score)  Addressed    Flu vaccine  Completed    Pneumococcal low/med risk  Completed             (applicable per patient's age: Cancer Screenings, Depression Screening, Fall Risk Screening, Immunizations)    Hemoglobin A1C (%)   Date Value   12/01/2017 7.0   03/29/2017 7.0   10/24/2016 6.9     LDL Calculated (mg/dL)   Date Value   07/07/2017 72     AST (U/L)   Date Value   01/24/2017 14     ALT (U/L)   Date Value   01/24/2017 15     BUN (mg/dL)   Date Value   06/24/2017 15      (goal A1C is < 7)   (goal LDL is <100) need 30-50% reduction from baseline     BP Readings from Last 3 Encounters:   12/15/17 112/70   12/01/17 (!) 144/80   06/24/17 (!) 152/61    (goal /80)      All Future Testing planned in [facility]:  Lab Frequency Next Occurrence   CT CHEST DIAGNOSTIC LOW DOSE Once 03/01/2018       Next Visit Date:  Future Appointments  Date Time Provider Jahaira Lubin   3/15/2018 10:00 AM Janis Leyva CNP [facility]            Patient Active Problem List:     HH (hiatus hernia)     Gum disease     TIA (transient ischemic attack)     Sleep apnea     Osteoarthritis     COPD exacerbation ([facility] [address]     Depression     Essential hypertension     Cerebrovascular disease     CHF (congestive heart failure) ([address]     Carotid artery